# Patient Record
Sex: FEMALE | Race: BLACK OR AFRICAN AMERICAN | NOT HISPANIC OR LATINO | RURAL
[De-identification: names, ages, dates, MRNs, and addresses within clinical notes are randomized per-mention and may not be internally consistent; named-entity substitution may affect disease eponyms.]

---

## 2019-05-09 ENCOUNTER — OFFICE VISIT (OUTPATIENT)
Dept: FAMILY MEDICINE CLINIC | Facility: CLINIC | Age: 19
End: 2019-05-09

## 2019-05-09 VITALS
DIASTOLIC BLOOD PRESSURE: 78 MMHG | TEMPERATURE: 97 F | WEIGHT: 126 LBS | HEART RATE: 60 BPM | SYSTOLIC BLOOD PRESSURE: 122 MMHG | BODY MASS INDEX: 19.78 KG/M2 | HEIGHT: 67 IN

## 2019-05-09 DIAGNOSIS — R20.0 NUMBNESS: Primary | ICD-10-CM

## 2019-05-09 PROCEDURE — 99201 PR OFFICE OUTPATIENT NEW 10 MINUTES: CPT | Performed by: NURSE PRACTITIONER

## 2019-05-09 NOTE — PROGRESS NOTES
"Subjective   Ashley Cuevas is a 19 y.o. female.     [facility] Visit    PCP: Bridgett Roth    CC: \"whole body numb, no feeling sense inside and out, balance, coordination, interferes with walking\"    Reports to MA she had stillbirth on 9-21-19 and had D&C on 9-22-19 and symptoms started after that.  Has previously been seen by Neurology (Dr. Heck) and reports nothing was done and no tests were performed--records release form was obtained by the MA.     When I entered room to get history, patient became upset and angry and said she was going to have a panic attack and got up and stormed out of the room cussing and slamming doors and left the clinic.  She was accompanied by her mother.      No further history of exam was able to be performed on patient at visit.          The following portions of the patient's history were reviewed and updated as appropriate: allergies, current medications, past social history and problem list.    Review of Systems    Objective    /78 (BP Location: Left arm, Patient Position: Sitting, Cuff Size: Adult)   Pulse 60   Temp 97 °F (36.1 °C)   Ht 170.2 cm (67\")   Wt 57.2 kg (126 lb)   BMI 19.73 kg/m²     Physical Exam   Constitutional: She appears well-developed and well-nourished.   Neurological: She is alert.   Psychiatric: Her mood appears anxious. Her affect is angry. She is agitated.   Patient became angry upon me entering room to do history and left room cussing and slamming doors.    Nursing note and vitals reviewed.        Assessment/Plan   Ashley was seen today for balance issues.    Diagnoses and all orders for this visit:    Numbness    No additional history or exam was able to be obtained prior to patient storming out of clinic. Patient was accompanied by her mother when she left.            "